# Patient Record
Sex: MALE | Race: WHITE | Employment: OTHER | URBAN - METROPOLITAN AREA
[De-identification: names, ages, dates, MRNs, and addresses within clinical notes are randomized per-mention and may not be internally consistent; named-entity substitution may affect disease eponyms.]

---

## 2017-07-31 ENCOUNTER — OFFICE VISIT (OUTPATIENT)
Dept: FAMILY MEDICINE CLINIC | Facility: CLINIC | Age: 62
End: 2017-07-31

## 2017-07-31 VITALS
SYSTOLIC BLOOD PRESSURE: 142 MMHG | WEIGHT: 204 LBS | HEART RATE: 64 BPM | DIASTOLIC BLOOD PRESSURE: 70 MMHG | RESPIRATION RATE: 16 BRPM | TEMPERATURE: 98 F | HEIGHT: 68 IN | BODY MASS INDEX: 30.92 KG/M2

## 2017-07-31 DIAGNOSIS — M62.830 BACK MUSCLE SPASM: Primary | ICD-10-CM

## 2017-07-31 PROCEDURE — 99213 OFFICE O/P EST LOW 20 MIN: CPT | Performed by: FAMILY MEDICINE

## 2017-07-31 NOTE — PROGRESS NOTES
Patient presents with:  Low Back Pain: x 6 months w/ lower back pain that is always there. Pt states certain movements increase pain.  Pain level 6/10      HPI:   Mirella Cardenas is a 60year old male who presents to the office for back pain eval.  Often

## 2017-09-05 PROBLEM — D36.9 ADENOMATOUS POLYP: Status: ACTIVE | Noted: 2017-09-05

## 2017-11-13 ENCOUNTER — PATIENT MESSAGE (OUTPATIENT)
Dept: FAMILY MEDICINE CLINIC | Facility: CLINIC | Age: 62
End: 2017-11-13

## 2017-11-13 NOTE — TELEPHONE ENCOUNTER
From: Taniya November  To: Pancho Salcedo MD  Sent: 11/13/2017 2:15 AM CST  Subject: Other    This is premature but I like planning ahead. We are working on adopting a little girl currently under our care here in [place].  Her heart has only a single atr

## 2017-12-22 ENCOUNTER — PATIENT MESSAGE (OUTPATIENT)
Dept: FAMILY MEDICINE CLINIC | Facility: CLINIC | Age: 62
End: 2017-12-22

## 2017-12-26 NOTE — TELEPHONE ENCOUNTER
From: Patrick Jett  To: Neela Crawford MD  Sent: 12/22/2017 10:09 PM CST  Subject: Other    Rydal is busy making processes for foreigners more difficult. Now I need a letter from you just like Talia Matos needed.   Our adoption agency says –  If you have

## 2017-12-27 ENCOUNTER — PATIENT MESSAGE (OUTPATIENT)
Dept: FAMILY MEDICINE CLINIC | Facility: CLINIC | Age: 62
End: 2017-12-27

## 2017-12-27 NOTE — TELEPHONE ENCOUNTER
Note sent to pt thru [address] [place] [address] hoping to get response on where and who to send letter to (pt in [facility])

## 2017-12-27 NOTE — TELEPHONE ENCOUNTER
I have written a letter for patient. I have it in my office. I need to find out how to get it to the individual he has named. Is there a fax or alternate method of getting there? Mailing address?     It might be easier to e-mail  Lev Willoughby as I suspec

## 2017-12-27 NOTE — TELEPHONE ENCOUNTER
Note sent thru my chart per Dr. Dayami Summers awaiting instruction on how to send it. I have actual signed letter.

## 2018-01-02 NOTE — TELEPHONE ENCOUNTER
From: Cookie Ricketts  To: Mirta Wills  Sent: 12/27/2017 5:33 PM CST  Subject: Letter for you    Dear Ernestine Mills,    Dr. Arun Galan has written a letter and wants to know is there a fax or alternate method of getting there? Mailing address?  We are unable to e-

## 2018-01-18 ENCOUNTER — PATIENT MESSAGE (OUTPATIENT)
Dept: FAMILY MEDICINE CLINIC | Facility: CLINIC | Age: 63
End: 2018-01-18

## 2018-01-19 NOTE — TELEPHONE ENCOUNTER
From: Babak Hernández  To: Vasile Hadley MD  Sent: 1/18/2018 10:55 PM CST  Subject: Other    My son emailed me saying that the office called to schedule a followup appointment. Who is the followup appointment for, and concerning what?  You should know

## 2018-09-27 ENCOUNTER — TELEPHONE (OUTPATIENT)
Dept: FAMILY MEDICINE CLINIC | Facility: CLINIC | Age: 63
End: 2018-09-27

## 2018-09-27 DIAGNOSIS — Z12.5 SCREENING FOR MALIGNANT NEOPLASM OF PROSTATE: ICD-10-CM

## 2018-09-27 DIAGNOSIS — E78.00 PURE HYPERCHOLESTEROLEMIA: ICD-10-CM

## 2018-09-27 DIAGNOSIS — E55.9 VITAMIN D DEFICIENCY: Primary | ICD-10-CM

## 2018-09-27 DIAGNOSIS — Z00.00 LABORATORY EXAM ORDERED AS PART OF ROUTINE GENERAL MEDICAL EXAMINATION: ICD-10-CM

## 2018-09-27 NOTE — TELEPHONE ENCOUNTER
Pt scheduled physical and needs a blood work order sent to [facility].

## 2018-10-16 ENCOUNTER — APPOINTMENT (OUTPATIENT)
Dept: LAB | Age: 63
End: 2018-10-16
Attending: FAMILY MEDICINE
Payer: COMMERCIAL

## 2018-10-16 DIAGNOSIS — Z12.5 SCREENING FOR MALIGNANT NEOPLASM OF PROSTATE: ICD-10-CM

## 2018-10-16 DIAGNOSIS — E55.9 VITAMIN D DEFICIENCY: ICD-10-CM

## 2018-10-16 DIAGNOSIS — Z00.00 LABORATORY EXAM ORDERED AS PART OF ROUTINE GENERAL MEDICAL EXAMINATION: ICD-10-CM

## 2018-10-16 DIAGNOSIS — E78.00 PURE HYPERCHOLESTEROLEMIA: ICD-10-CM

## 2018-10-16 PROCEDURE — 85027 COMPLETE CBC AUTOMATED: CPT

## 2018-10-16 PROCEDURE — 36415 COLL VENOUS BLD VENIPUNCTURE: CPT

## 2018-10-16 PROCEDURE — 80061 LIPID PANEL: CPT

## 2018-10-16 PROCEDURE — 80053 COMPREHEN METABOLIC PANEL: CPT

## 2018-10-16 PROCEDURE — 82306 VITAMIN D 25 HYDROXY: CPT

## 2018-10-24 ENCOUNTER — OFFICE VISIT (OUTPATIENT)
Dept: FAMILY MEDICINE CLINIC | Facility: CLINIC | Age: 63
End: 2018-10-24

## 2018-10-24 VITALS
WEIGHT: 201 LBS | RESPIRATION RATE: 16 BRPM | HEIGHT: 67.5 IN | BODY MASS INDEX: 31.18 KG/M2 | DIASTOLIC BLOOD PRESSURE: 60 MMHG | SYSTOLIC BLOOD PRESSURE: 120 MMHG | TEMPERATURE: 98 F | HEART RATE: 60 BPM

## 2018-10-24 DIAGNOSIS — G47.9 SLEEP DISTURBANCE: ICD-10-CM

## 2018-10-24 DIAGNOSIS — Z00.00 ANNUAL PHYSICAL EXAM: Primary | ICD-10-CM

## 2018-10-24 DIAGNOSIS — E55.9 VITAMIN D DEFICIENCY: ICD-10-CM

## 2018-10-24 DIAGNOSIS — Z23 NEEDS FLU SHOT: ICD-10-CM

## 2018-10-24 DIAGNOSIS — E78.00 PURE HYPERCHOLESTEROLEMIA: ICD-10-CM

## 2018-10-24 PROCEDURE — G0438 PPPS, INITIAL VISIT: HCPCS | Performed by: FAMILY MEDICINE

## 2018-10-24 PROCEDURE — 99396 PREV VISIT EST AGE 40-64: CPT | Performed by: FAMILY MEDICINE

## 2018-10-24 PROCEDURE — 90686 IIV4 VACC NO PRSV 0.5 ML IM: CPT | Performed by: FAMILY MEDICINE

## 2018-10-24 PROCEDURE — 90471 IMMUNIZATION ADMIN: CPT | Performed by: FAMILY MEDICINE

## 2018-10-24 NOTE — PROGRESS NOTES
Patient presents with:  Physical: annual physical, labs completed flu shot     HPI:   William Martins is a 58year old male who presents for a complete physical exam. Sleep and fatigue issues right now. Trouble staying asleep.       Last colonoscopy:  10 medical history on file.    Past Surgical History:   Procedure Laterality Date   • COLONOSCOPY  1/1/09   • COLONOSCOPY  1/1/01    R Leg placement of pins   • COLONOSCOPY WITH BIOPSY  10/14/14    colon polyps;diverticulil;internal hemorrhoids   • VASECTOMY symmetric reflexes. Normal coordination and gait      ASSESSMENT AND PLAN:     Aisha Moreno was seen in the office today:  had concerns including Physical (annual physical, labs completed flu shot).     1. Annual physical exam  Overall well.   c-scope

## 2018-11-26 ENCOUNTER — NURSE ONLY (OUTPATIENT)
Dept: FAMILY MEDICINE CLINIC | Facility: CLINIC | Age: 63
End: 2018-11-26

## 2018-11-26 PROCEDURE — 90471 IMMUNIZATION ADMIN: CPT | Performed by: FAMILY MEDICINE

## 2018-11-26 PROCEDURE — 90750 HZV VACC RECOMBINANT IM: CPT | Performed by: FAMILY MEDICINE

## 2018-11-26 NOTE — PROGRESS NOTES
Pt received shingrix injection today, pt handled it well, advise to make appointment for next injection.

## 2019-02-26 ENCOUNTER — TELEPHONE (OUTPATIENT)
Dept: FAMILY MEDICINE CLINIC | Facility: CLINIC | Age: 64
End: 2019-02-26

## 2019-02-26 NOTE — TELEPHONE ENCOUNTER
Please assist patient with scheduling a nurse visit for 2nd Shingrix vaccine, any time before May 26, 2019

## 2019-03-18 ENCOUNTER — NURSE ONLY (OUTPATIENT)
Dept: FAMILY MEDICINE CLINIC | Facility: CLINIC | Age: 64
End: 2019-03-18
Payer: COMMERCIAL

## 2019-03-18 ENCOUNTER — TELEPHONE (OUTPATIENT)
Dept: FAMILY MEDICINE CLINIC | Facility: CLINIC | Age: 64
End: 2019-03-18

## 2019-03-18 DIAGNOSIS — Z23 NEED FOR VACCINATION: Primary | ICD-10-CM

## 2019-03-18 PROCEDURE — 90750 HZV VACC RECOMBINANT IM: CPT | Performed by: FAMILY MEDICINE

## 2019-03-18 PROCEDURE — 90471 IMMUNIZATION ADMIN: CPT | Performed by: FAMILY MEDICINE

## 2019-03-18 NOTE — PROGRESS NOTES
Patient presented to the office today for his second dose of Shingrix. The VIS was given to the patient. The waiver was signed. The patient reported no complaints from his first dose of the vaccine which was given 11/26/18.    The chart and vaccine was re

## 2019-04-02 ENCOUNTER — OFFICE VISIT (OUTPATIENT)
Dept: FAMILY MEDICINE CLINIC | Facility: CLINIC | Age: 64
End: 2019-04-02
Payer: COMMERCIAL

## 2019-04-02 VITALS
SYSTOLIC BLOOD PRESSURE: 110 MMHG | DIASTOLIC BLOOD PRESSURE: 60 MMHG | HEART RATE: 84 BPM | BODY MASS INDEX: 31.4 KG/M2 | TEMPERATURE: 99 F | HEIGHT: 69 IN | RESPIRATION RATE: 16 BRPM | WEIGHT: 212 LBS

## 2019-04-02 DIAGNOSIS — J06.9 VIRAL URI: Primary | ICD-10-CM

## 2019-04-02 DIAGNOSIS — J02.9 SORE THROAT: ICD-10-CM

## 2019-04-02 PROCEDURE — 99213 OFFICE O/P EST LOW 20 MIN: CPT | Performed by: FAMILY MEDICINE

## 2019-04-02 PROCEDURE — 87880 STREP A ASSAY W/OPTIC: CPT | Performed by: FAMILY MEDICINE

## 2019-04-02 NOTE — PROGRESS NOTES
Patient presents with:  Sore Throat: sore throat x day     HPI:   Fina Powell is a 58year old male who presents to the office for eval of sore throat. Present about a day. No fever. No cough, just pain in throat. +sputum.       REVIEW OF SYSTEMS:

## 2019-07-31 ENCOUNTER — TELEPHONE (OUTPATIENT)
Dept: FAMILY MEDICINE CLINIC | Facility: CLINIC | Age: 64
End: 2019-07-31

## 2019-07-31 DIAGNOSIS — E78.00 PURE HYPERCHOLESTEROLEMIA: Primary | ICD-10-CM

## 2019-07-31 DIAGNOSIS — E55.9 VITAMIN D DEFICIENCY: ICD-10-CM

## 2019-07-31 DIAGNOSIS — Z12.5 SCREENING FOR MALIGNANT NEOPLASM OF PROSTATE: ICD-10-CM

## 2019-07-31 NOTE — TELEPHONE ENCOUNTER
Please enter lab orders for the patient's upcoming physical appointment. Physical scheduled:    Your appointments     Date & Time Appointment Department [facility])    Oct 25, 2019  9:30 AM CDT Physical - Established Patient with MD Vasiliy Rivers

## 2019-10-03 ENCOUNTER — APPOINTMENT (OUTPATIENT)
Dept: LAB | Age: 64
End: 2019-10-03
Attending: FAMILY MEDICINE
Payer: MEDICARE

## 2019-10-03 DIAGNOSIS — Z12.5 SCREENING FOR MALIGNANT NEOPLASM OF PROSTATE: ICD-10-CM

## 2019-10-03 DIAGNOSIS — E55.9 VITAMIN D DEFICIENCY: ICD-10-CM

## 2019-10-03 DIAGNOSIS — E78.00 PURE HYPERCHOLESTEROLEMIA: ICD-10-CM

## 2019-10-03 PROCEDURE — 80061 LIPID PANEL: CPT

## 2019-10-03 PROCEDURE — 80053 COMPREHEN METABOLIC PANEL: CPT

## 2019-10-03 PROCEDURE — 82306 VITAMIN D 25 HYDROXY: CPT

## 2019-10-25 ENCOUNTER — OFFICE VISIT (OUTPATIENT)
Dept: FAMILY MEDICINE CLINIC | Facility: CLINIC | Age: 64
End: 2019-10-25
Payer: MEDICARE

## 2019-10-25 VITALS
BODY MASS INDEX: 31.05 KG/M2 | SYSTOLIC BLOOD PRESSURE: 108 MMHG | HEART RATE: 78 BPM | DIASTOLIC BLOOD PRESSURE: 80 MMHG | HEIGHT: 67.72 IN | TEMPERATURE: 99 F | WEIGHT: 202.5 LBS | RESPIRATION RATE: 16 BRPM

## 2019-10-25 DIAGNOSIS — E55.9 VITAMIN D DEFICIENCY: ICD-10-CM

## 2019-10-25 DIAGNOSIS — Z23 NEED FOR VACCINATION: ICD-10-CM

## 2019-10-25 DIAGNOSIS — Z00.00 ENCOUNTER FOR ANNUAL HEALTH EXAMINATION: Primary | ICD-10-CM

## 2019-10-25 DIAGNOSIS — Z12.11 COLON CANCER SCREENING: ICD-10-CM

## 2019-10-25 DIAGNOSIS — E78.00 PURE HYPERCHOLESTEROLEMIA: ICD-10-CM

## 2019-10-25 PROCEDURE — G0009 ADMIN PNEUMOCOCCAL VACCINE: HCPCS | Performed by: FAMILY MEDICINE

## 2019-10-25 PROCEDURE — G0402 INITIAL PREVENTIVE EXAM: HCPCS | Performed by: FAMILY MEDICINE

## 2019-10-25 PROCEDURE — 90662 IIV NO PRSV INCREASED AG IM: CPT | Performed by: FAMILY MEDICINE

## 2019-10-25 PROCEDURE — G0008 ADMIN INFLUENZA VIRUS VAC: HCPCS | Performed by: FAMILY MEDICINE

## 2019-10-25 PROCEDURE — 90670 PCV13 VACCINE IM: CPT | Performed by: FAMILY MEDICINE

## 2019-10-25 NOTE — PATIENT INSTRUCTIONS
Nelson Mcqueen 4740 SCHEDULE   Tests on this list are recommended by your physician but may not be covered, or covered at this frequency, by your insurer. Please check with your insurance carrier before scheduling to verify coverage.     PREVENT criteria:   • Men who are 73-68 years old and have smoked more than 100 cigarettes in their lifetime   • Anyone with a family history    Colorectal Cancer Screening Covered up to Age 76     Colonoscopy Screen   Covered every 10 years- more often if abnorma institutions for the mentally retarded   Persons who live in the same house as a HepB virus carrier   Homosexual men   Illicit injectable drug abusers     Tetanus Toxoid- Only covered with a cut with metal- TD and TDaP Not covered by Medicare Part B) Order

## 2019-10-25 NOTE — PROGRESS NOTES
HPI:   Dar Dominique is a 71year old male who presents for a Medicare Initial Preventative Physical Exam (Welcome to Medicare- < 12 months on Medicare). Preventative Screening  Colonoscopy - 10/25/2014. Due for repeat now. Prostate - 1.23.   Gina Lubna Allen was screened for Alcohol abuse and had a score of 0 so is at low risk.      Patient Care Team: Patient Care Team:  Radha Palomares MD as PCP - General (Family Medicine)  Fabi Rothman MD (GASTROENTEROLOGY)    Patient Active Problem List:     Romy García Constitutional: negative  Eyes: negative  Ears, nose, mouth, throat, and face: negative  Respiratory: negative  Cardiovascular: negative  Gastrointestinal: negative  Genitourinary: negative  Neurological: negative     EXAM:   /80   Pulse 78   Temp History   Administered Date(s) Administered   • FLULAVAL 6 months & older 0.5 ml Prefilled syringe (00293) 10/24/2018   • FLUZONE 3 Yrs+ Quad Prsv Free 0.5 ml (04167) 11/14/2016   • HEP B 06/01/2000, 01/01/2006, 03/01/2006   • IPV 06/04/2012   • Influenza do you maintain positive mental well-being?: Visiting Friends    This section provided for quick review of chart, separate sheet to patient  [address] Internal Lab or Procedure External Lab or Procedure   Diabetes Scre house as a HepB virus carrier   Homosexual men   Illicit injectable drug abusers     Tetanus Toxoid  Only covered with a cut with metal- TD and TDaP Not covered by Medicare Part B) 05/01/2006 This may be covered with your prescription benefits, but Barbara Wheeler

## 2020-01-15 PROBLEM — Z86.010 PERSONAL HISTORY OF COLONIC POLYPS: Status: ACTIVE | Noted: 2020-01-15

## 2020-01-15 PROBLEM — Z86.0100 PERSONAL HISTORY OF COLONIC POLYPS: Status: ACTIVE | Noted: 2020-01-15

## 2020-01-15 PROBLEM — D12.5 BENIGN NEOPLASM OF SIGMOID COLON: Status: ACTIVE | Noted: 2020-01-15

## 2020-02-25 ENCOUNTER — HOSPITAL ENCOUNTER (OUTPATIENT)
Facility: HOSPITAL | Age: 65
Setting detail: HOSPITAL OUTPATIENT SURGERY
Discharge: HOME | End: 2020-02-25
Attending: INTERNAL MEDICINE | Admitting: INTERNAL MEDICINE
Payer: MEDICARE

## 2020-02-25 ENCOUNTER — ANESTHESIA EVENT (OUTPATIENT)
Dept: ENDOSCOPY | Facility: HOSPITAL | Age: 65
End: 2020-02-25
Payer: MEDICARE

## 2020-02-25 ENCOUNTER — ANESTHESIA (OUTPATIENT)
Dept: ENDOSCOPY | Facility: HOSPITAL | Age: 65
End: 2020-02-25
Payer: MEDICARE

## 2020-02-25 VITALS
TEMPERATURE: 99 F | OXYGEN SATURATION: 92 % | RESPIRATION RATE: 18 BRPM | HEART RATE: 65 BPM | WEIGHT: 200 LBS | SYSTOLIC BLOOD PRESSURE: 120 MMHG | DIASTOLIC BLOOD PRESSURE: 85 MMHG | BODY MASS INDEX: 29.62 KG/M2 | HEIGHT: 69 IN

## 2020-02-25 DIAGNOSIS — K64.9 HEMORRHOIDS, UNSPECIFIED HEMORRHOID TYPE: ICD-10-CM

## 2020-02-25 PROCEDURE — 06LY4CC OCCLUSION OF HEMORRHOIDAL PLEXUS WITH EXTRALUMINAL DEVICE, PERCUTANEOUS ENDOSCOPIC APPROACH: ICD-10-PCS | Performed by: INTERNAL MEDICINE

## 2020-02-25 RX ORDER — SODIUM CHLORIDE, SODIUM LACTATE, POTASSIUM CHLORIDE, CALCIUM CHLORIDE 600; 310; 30; 20 MG/100ML; MG/100ML; MG/100ML; MG/100ML
INJECTION, SOLUTION INTRAVENOUS CONTINUOUS
Status: DISCONTINUED | OUTPATIENT
Start: 2020-02-25 | End: 2020-02-25

## 2020-02-25 RX ORDER — NALOXONE HYDROCHLORIDE 0.4 MG/ML
80 INJECTION, SOLUTION INTRAMUSCULAR; INTRAVENOUS; SUBCUTANEOUS AS NEEDED
Status: DISCONTINUED | OUTPATIENT
Start: 2020-02-25 | End: 2020-02-25

## 2020-02-25 RX ORDER — HYDROCODONE BITARTRATE AND ACETAMINOPHEN 10; 325 MG/1; MG/1
2 TABLET ORAL AS NEEDED
Status: DISCONTINUED | OUTPATIENT
Start: 2020-02-25 | End: 2020-02-25

## 2020-02-25 RX ORDER — ONDANSETRON 2 MG/ML
4 INJECTION INTRAMUSCULAR; INTRAVENOUS AS NEEDED
Status: DISCONTINUED | OUTPATIENT
Start: 2020-02-25 | End: 2020-02-25

## 2020-02-25 RX ORDER — HYDROCODONE BITARTRATE AND ACETAMINOPHEN 10; 325 MG/1; MG/1
1 TABLET ORAL AS NEEDED
Status: DISCONTINUED | OUTPATIENT
Start: 2020-02-25 | End: 2020-02-25

## 2020-02-25 RX ORDER — METOCLOPRAMIDE HYDROCHLORIDE 5 MG/ML
10 INJECTION INTRAMUSCULAR; INTRAVENOUS AS NEEDED
Status: DISCONTINUED | OUTPATIENT
Start: 2020-02-25 | End: 2020-02-25

## 2020-02-25 RX ORDER — HYDROMORPHONE HYDROCHLORIDE 1 MG/ML
0.4 INJECTION, SOLUTION INTRAMUSCULAR; INTRAVENOUS; SUBCUTANEOUS EVERY 5 MIN PRN
Status: DISCONTINUED | OUTPATIENT
Start: 2020-02-25 | End: 2020-02-25

## 2020-02-25 RX ORDER — LIDOCAINE HYDROCHLORIDE 10 MG/ML
INJECTION, SOLUTION EPIDURAL; INFILTRATION; INTRACAUDAL; PERINEURAL AS NEEDED
Status: DISCONTINUED | OUTPATIENT
Start: 2020-02-25 | End: 2020-02-25 | Stop reason: SURG

## 2020-02-25 RX ADMIN — LIDOCAINE HYDROCHLORIDE 25 MG: 10 INJECTION, SOLUTION EPIDURAL; INFILTRATION; INTRACAUDAL; PERINEURAL at 08:12:00

## 2020-02-25 RX ADMIN — SODIUM CHLORIDE, SODIUM LACTATE, POTASSIUM CHLORIDE, CALCIUM CHLORIDE: 600; 310; 30; 20 INJECTION, SOLUTION INTRAVENOUS at 08:25:00

## 2020-02-25 NOTE — ANESTHESIA PREPROCEDURE EVALUATION
PRE-OP EVALUATION    Patient Name: Alan La    Pre-op Diagnosis: Hemorrhoids, unspecified hemorrhoid type [K64.9]    Procedure(s):   FLEXIBLE SIGMOIDOSCOPY with banding    Surgeon(s) and Role:     Dar Mane MD - Primary    Pre-op vitals notable dental history. Pulmonary    Pulmonary exam normal.  Breath sounds clear to auscultation bilaterally.                Other findings            ASA: 2   Plan: MAC and general  NPO status verified and           Plan/risks discussed with: rashard

## 2020-02-25 NOTE — H&P
[address] Patient Status:  Hospital Outpatient Surgery    1955 MRN [id number]   [facility] ENDOSCOPY Attending Dirk Morgan MD   Date 2020 PCP Gina Perez MD     CC: Hemorrhoid normal.    Lungs: Clear to auscultation. Abdomen: Soft and nondistended. Nontender. No masses. Bowel sounds are present. Extremities: No edema, cyanosis, or clubbing. Skin: Warm and dry.       Assessment/Plan/Procedure:  Patient Active Problem List:

## 2020-02-25 NOTE — ANESTHESIA POSTPROCEDURE EVALUATION
[address] Patient Status:  Hospital Outpatient Surgery   Age/Gender 71year old male MRN [id number]   Location [facility]. Attending Rupinder Mccracken MD   [facility] Day # 0 PCP Julisa Leihg MD       Anesthesia Post-

## 2020-02-25 NOTE — OPERATIVE REPORT
Aaln La Patient Status:  Hospital Outpatient Surgery    1955 MRN [id number]   [facility] ENDOSCOPY Attending Zaki Zavaleta MD   Date 2020 PCP Gregorio Porras MD     PREOPERATIVE DIAGNOSIS/INDICATION: Hemorrhoids

## 2020-02-26 ENCOUNTER — TELEPHONE (OUTPATIENT)
Dept: FAMILY MEDICINE CLINIC | Facility: CLINIC | Age: 65
End: 2020-02-26

## 2020-10-13 ENCOUNTER — TELEPHONE (OUTPATIENT)
Dept: FAMILY MEDICINE CLINIC | Facility: CLINIC | Age: 65
End: 2020-10-13

## 2020-10-13 DIAGNOSIS — Z12.5 SCREENING FOR MALIGNANT NEOPLASM OF PROSTATE: ICD-10-CM

## 2020-10-13 DIAGNOSIS — E78.00 PURE HYPERCHOLESTEROLEMIA: Primary | ICD-10-CM

## 2020-10-13 DIAGNOSIS — E55.9 VITAMIN D DEFICIENCY: ICD-10-CM

## 2020-10-13 NOTE — TELEPHONE ENCOUNTER
Please enter lab orders for the patient's upcoming physical appointment. Physical scheduled:    Your appointments     Date & Time Appointment Department [facility])    Nov 02, 2020  3:00 PM CST Physical - Established with Leonel Jeffrey MD [address] [place] [address]

## 2020-10-19 ENCOUNTER — LAB ENCOUNTER (OUTPATIENT)
Dept: LAB | Age: 65
End: 2020-10-19
Attending: FAMILY MEDICINE
Payer: MEDICARE

## 2020-10-19 DIAGNOSIS — E78.00 PURE HYPERCHOLESTEROLEMIA: ICD-10-CM

## 2020-10-19 DIAGNOSIS — Z12.5 SCREENING FOR MALIGNANT NEOPLASM OF PROSTATE: ICD-10-CM

## 2020-10-19 DIAGNOSIS — E55.9 VITAMIN D DEFICIENCY: ICD-10-CM

## 2020-10-19 PROCEDURE — 80053 COMPREHEN METABOLIC PANEL: CPT

## 2020-10-19 PROCEDURE — 80061 LIPID PANEL: CPT

## 2020-10-19 PROCEDURE — 36415 COLL VENOUS BLD VENIPUNCTURE: CPT

## 2020-10-19 PROCEDURE — 82306 VITAMIN D 25 HYDROXY: CPT

## 2020-11-02 ENCOUNTER — OFFICE VISIT (OUTPATIENT)
Dept: FAMILY MEDICINE CLINIC | Facility: CLINIC | Age: 65
End: 2020-11-02
Payer: MEDICARE

## 2020-11-02 VITALS
BODY MASS INDEX: 31.39 KG/M2 | RESPIRATION RATE: 16 BRPM | HEART RATE: 88 BPM | SYSTOLIC BLOOD PRESSURE: 138 MMHG | WEIGHT: 202.38 LBS | TEMPERATURE: 97 F | DIASTOLIC BLOOD PRESSURE: 72 MMHG | HEIGHT: 67.5 IN

## 2020-11-02 DIAGNOSIS — E55.9 VITAMIN D DEFICIENCY: ICD-10-CM

## 2020-11-02 DIAGNOSIS — E78.00 PURE HYPERCHOLESTEROLEMIA: ICD-10-CM

## 2020-11-02 DIAGNOSIS — Z00.00 ENCOUNTER FOR ANNUAL HEALTH EXAMINATION: Primary | ICD-10-CM

## 2020-11-02 DIAGNOSIS — Z23 NEED FOR VACCINATION: ICD-10-CM

## 2020-11-02 PROCEDURE — 90732 PPSV23 VACC 2 YRS+ SUBQ/IM: CPT | Performed by: FAMILY MEDICINE

## 2020-11-02 PROCEDURE — G0008 ADMIN INFLUENZA VIRUS VAC: HCPCS | Performed by: FAMILY MEDICINE

## 2020-11-02 PROCEDURE — G0009 ADMIN PNEUMOCOCCAL VACCINE: HCPCS | Performed by: FAMILY MEDICINE

## 2020-11-02 PROCEDURE — G0438 PPPS, INITIAL VISIT: HCPCS | Performed by: FAMILY MEDICINE

## 2020-11-02 PROCEDURE — 90662 IIV NO PRSV INCREASED AG IM: CPT | Performed by: FAMILY MEDICINE

## 2020-11-02 NOTE — PATIENT INSTRUCTIONS
Nelson Mcqueen 4740 SCHEDULE   Tests on this list are recommended by your physician but may not be covered, or covered at this frequency, by your insurer. Please check with your insurance carrier before scheduling to verify coverage.     PREVENT • Men who are 73-68 years old and have smoked more than 100 cigarettes in their lifetime   • Anyone with a family history    Colorectal Cancer Screening Covered up to Age 76     Colonoscopy Screen   Covered every 10 years- more often if abnormal Colonosc concentrates   Clients of institutions for the mentally retarded   Persons who live in the same house as a HepB virus carrier   Homosexual men   Illicit injectable drug abusers     Tetanus Toxoid- Only covered with a cut with metal- TD and TDaP Not covered

## 2020-11-02 NOTE — PROGRESS NOTES
HPI:   Camilla Essex is a 76year old male who presents for a Medicare Initial Annual Wellness visit (Once after 12 month Medicare anniversary) . Preventative Screening  Colonoscopy - 1/2020. Repeat 5 yrs - 2025  Prostate - 1.39 on recent labs.    [facility])  Edson Garrido MD (GASTROENTEROLOGY)    Patient Active Problem List:     Sprain of medial collateral ligament of knee     Pure hypercholesterolemia     Vitamin D deficiency     BPH (benign prostatic hyperplasia)     Adenomatous polyp     Perso Diabetes in his father and maternal grandmother; Pacemaker in his father; Seizure Disorder in his father; Stroke in his mother; Uterine Cancer in his sister. SOCIAL HISTORY:   He  reports that he has never smoked.  He has never used smokeless tobacco. He masses, no organomegaly   Extremities: Extremities normal, atraumatic, no cyanosis or edema   Pulses: 2+ and symmetric   Skin: Skin color, texture, turgor normal, no rashes or lesions   Neurologic: Normal         Vaccination History     Immunization Histor poor?: No  How does the patient maintain a good energy level?: Other(works around the house)  How would you describe your daily physical activity?: Moderate  How would you describe your current health state?: Good  How do you maintain positive mental well- 03/01/2006 Medium/high risk factors:   End-stage renal disease   Hemophiliacs who received Factor VIII or IX concentrates   Clients of institutions for the mentally retarded   Persons who live in the same house as a HepB virus carrier   Homosexual men   [place]

## 2021-03-13 DIAGNOSIS — Z23 NEED FOR VACCINATION: ICD-10-CM

## 2021-03-23 ENCOUNTER — PATIENT MESSAGE (OUTPATIENT)
Dept: FAMILY MEDICINE CLINIC | Facility: CLINIC | Age: 66
End: 2021-03-23

## 2021-03-24 NOTE — TELEPHONE ENCOUNTER
From: Nate Cornejo  To: Klyah Rios MD  Sent: 3/23/2021 6:13 PM CDT  Subject: Other    Covid-19 Vaccination Record

## 2021-05-11 ENCOUNTER — OFFICE VISIT (OUTPATIENT)
Dept: FAMILY MEDICINE CLINIC | Facility: CLINIC | Age: 66
End: 2021-05-11
Payer: MEDICARE

## 2021-05-11 VITALS
HEIGHT: 68 IN | OXYGEN SATURATION: 98 % | RESPIRATION RATE: 16 BRPM | TEMPERATURE: 98 F | DIASTOLIC BLOOD PRESSURE: 72 MMHG | HEART RATE: 84 BPM | SYSTOLIC BLOOD PRESSURE: 130 MMHG | WEIGHT: 207 LBS | BODY MASS INDEX: 31.37 KG/M2

## 2021-05-11 DIAGNOSIS — R06.00 DYSPNEA ON EXERTION: Primary | ICD-10-CM

## 2021-05-11 PROCEDURE — 99214 OFFICE O/P EST MOD 30 MIN: CPT | Performed by: FAMILY MEDICINE

## 2021-05-11 NOTE — PROGRESS NOTES
Patient presents with:  Fatigue: SOB Concerns     HPI:   Levy Current is a 76year old male with PMH HLD who presents to the office for eval of fatigue. Over the last 6 months, getting more winded and out of breath with activity.      Denies any chest office today:  had concerns including Fatigue (SOB Concerns). 1. Dyspnea on exertion  Suspect deconditioning  Denies red flag symptoms like CP, palpitations  Exam benign.   Outside of being overweight RRR, normal HR, oxygen, lung sounds  Will start darrell

## 2021-05-21 ENCOUNTER — LAB ENCOUNTER (OUTPATIENT)
Dept: LAB | Facility: HOSPITAL | Age: 66
End: 2021-05-21
Attending: FAMILY MEDICINE
Payer: MEDICARE

## 2021-05-21 DIAGNOSIS — R06.00 DYSPNEA ON EXERTION: ICD-10-CM

## 2021-05-24 ENCOUNTER — HOSPITAL ENCOUNTER (OUTPATIENT)
Dept: CV DIAGNOSTICS | Facility: HOSPITAL | Age: 66
Discharge: HOME | End: 2021-05-24
Attending: FAMILY MEDICINE
Payer: MEDICARE

## 2021-05-24 DIAGNOSIS — R06.00 DYSPNEA ON EXERTION: ICD-10-CM

## 2021-05-24 PROCEDURE — 93018 CV STRESS TEST I&R ONLY: CPT | Performed by: FAMILY MEDICINE

## 2021-05-24 PROCEDURE — 93017 CV STRESS TEST TRACING ONLY: CPT | Performed by: FAMILY MEDICINE

## 2021-05-24 PROCEDURE — 4A12XM4 MONITORING OF CARDIAC STRESS, EXTERNAL APPROACH: ICD-10-PCS | Performed by: INTERNAL MEDICINE

## 2021-10-29 ENCOUNTER — TELEPHONE (OUTPATIENT)
Dept: FAMILY MEDICINE CLINIC | Facility: CLINIC | Age: 66
End: 2021-10-29

## 2021-11-03 ENCOUNTER — OFFICE VISIT (OUTPATIENT)
Dept: FAMILY MEDICINE CLINIC | Facility: CLINIC | Age: 66
End: 2021-11-03
Payer: MEDICARE

## 2021-11-03 VITALS
HEIGHT: 67.72 IN | BODY MASS INDEX: 31.58 KG/M2 | DIASTOLIC BLOOD PRESSURE: 74 MMHG | WEIGHT: 206 LBS | TEMPERATURE: 97 F | HEART RATE: 70 BPM | SYSTOLIC BLOOD PRESSURE: 128 MMHG | RESPIRATION RATE: 16 BRPM | OXYGEN SATURATION: 97 %

## 2021-11-03 DIAGNOSIS — Z00.00 ENCOUNTER FOR ANNUAL HEALTH EXAMINATION: Primary | ICD-10-CM

## 2021-11-03 DIAGNOSIS — E55.9 VITAMIN D DEFICIENCY: ICD-10-CM

## 2021-11-03 DIAGNOSIS — Z23 NEEDS FLU SHOT: ICD-10-CM

## 2021-11-03 DIAGNOSIS — E78.00 PURE HYPERCHOLESTEROLEMIA: ICD-10-CM

## 2021-11-03 DIAGNOSIS — F41.9 ANXIETY: ICD-10-CM

## 2021-11-03 DIAGNOSIS — Z12.5 SCREENING FOR MALIGNANT NEOPLASM OF PROSTATE: ICD-10-CM

## 2021-11-03 DIAGNOSIS — Z13.31 DEPRESSION SCREENING: ICD-10-CM

## 2021-11-03 PROBLEM — D12.5 BENIGN NEOPLASM OF SIGMOID COLON: Status: RESOLVED | Noted: 2020-01-15 | Resolved: 2021-11-03

## 2021-11-03 PROBLEM — D36.9 ADENOMATOUS POLYP: Status: RESOLVED | Noted: 2017-09-05 | Resolved: 2021-11-03

## 2021-11-03 PROBLEM — Z86.0100 PERSONAL HISTORY OF COLONIC POLYPS: Status: RESOLVED | Noted: 2020-01-15 | Resolved: 2021-11-03

## 2021-11-03 PROBLEM — Z86.010 PERSONAL HISTORY OF COLONIC POLYPS: Status: RESOLVED | Noted: 2020-01-15 | Resolved: 2021-11-03

## 2021-11-03 PROCEDURE — G0439 PPPS, SUBSEQ VISIT: HCPCS | Performed by: FAMILY MEDICINE

## 2021-11-03 PROCEDURE — G0008 ADMIN INFLUENZA VIRUS VAC: HCPCS | Performed by: FAMILY MEDICINE

## 2021-11-03 PROCEDURE — G0444 DEPRESSION SCREEN ANNUAL: HCPCS | Performed by: FAMILY MEDICINE

## 2021-11-03 PROCEDURE — 90662 IIV NO PRSV INCREASED AG IM: CPT | Performed by: FAMILY MEDICINE

## 2021-11-03 RX ORDER — TRAZODONE HYDROCHLORIDE 50 MG/1
50 TABLET ORAL NIGHTLY
Qty: 30 TABLET | Refills: 1 | Status: SHIPPED | OUTPATIENT
Start: 2021-11-03 | End: 2022-01-12

## 2021-11-03 NOTE — PROGRESS NOTES
HPI:   Rivera Amor is a 78year old male who presents for a Medicare Subsequent Annual Wellness visit (Pt already had Initial Annual Wellness). Preventative Screening  Colonoscopy -January 2020. Repeat 5 years-2025  Prostate - BPH.   PSA 1.39 a ye of  for [facility] on file in [facility] [address]    Advance care planning including the explanation and discussion of advance directives standard forms performed Face to Face with patient and Family/surrogate (if present), and forms available to patient in AVS Hemorrhoids (Decades), Irregular bowel habits, Sleep disturbance, Stress (Years), Visual impairment, and Wears glasses (1960).     He  has a past surgical history that includes colonoscopy (1/1/09); colonoscopy (1/1/01); vasectomy; and colonoscopy with biop intact   Neck: Supple, symmetrical, trachea midline, no adenopathy, thyroid: not enlarged, symmetric, no tenderness/mass/nodules   Back:   Symmetric, no curvature, ROM normal, no CVA tenderness   Lungs:   Clear to auscultation bilaterally, respirations unl COMP METABOLIC PANEL (14); Future  - LIPID PANEL; Future    3. Vitamin D deficiency  Checklevels   - VITAMIN D; Future    4. Screening for malignant neoplasm of prostate  routine  - PSA SCREEN; Future    5.  Needs flu shot  given  - FLU VACC HIGH DOSE PRSV every 5 years for all Medicare beneficiaries without apparent signs or symptoms of cardiovascular disease Lab Results   Component Value Date    CHOLEST 197 10/19/2020    HDL 50 10/19/2020     (H) 10/19/2020    TRIG 172 (H) 10/19/2020         Electro

## 2021-11-03 NOTE — PATIENT INSTRUCTIONS
Nelson Mcqueen 4740 SCHEDULE   Tests on this list are recommended by your physician but may not be covered, or covered at this frequency, by your insurer. Please check with your insurance carrier before scheduling to verify coverage.    PREVEN Each vaccine ([id number] & [id number]) covered once after 65 Prevnar 13: 10/25/2019    [id number]: 11/02/2020     No recommendations at this time    Hepatitis B One screening covered for patients with certain risk factors   03/01/2006  No recommendations

## 2021-11-11 ENCOUNTER — LAB ENCOUNTER (OUTPATIENT)
Dept: LAB | Age: 66
End: 2021-11-11
Attending: FAMILY MEDICINE
Payer: MEDICARE

## 2021-11-11 DIAGNOSIS — Z12.5 SCREENING FOR MALIGNANT NEOPLASM OF PROSTATE: ICD-10-CM

## 2021-11-11 DIAGNOSIS — E78.00 PURE HYPERCHOLESTEROLEMIA: ICD-10-CM

## 2021-11-11 DIAGNOSIS — E55.9 VITAMIN D DEFICIENCY: ICD-10-CM

## 2021-11-11 PROCEDURE — 36415 COLL VENOUS BLD VENIPUNCTURE: CPT

## 2021-11-11 PROCEDURE — 80061 LIPID PANEL: CPT

## 2021-11-11 PROCEDURE — 80053 COMPREHEN METABOLIC PANEL: CPT

## 2021-11-11 PROCEDURE — 82306 VITAMIN D 25 HYDROXY: CPT

## 2022-01-12 ENCOUNTER — TELEMEDICINE (OUTPATIENT)
Dept: FAMILY MEDICINE CLINIC | Facility: CLINIC | Age: 67
End: 2022-01-12

## 2022-01-12 DIAGNOSIS — R35.1 BPH ASSOCIATED WITH NOCTURIA: ICD-10-CM

## 2022-01-12 DIAGNOSIS — N40.1 BPH ASSOCIATED WITH NOCTURIA: ICD-10-CM

## 2022-01-12 DIAGNOSIS — G47.9 SLEEP DISTURBANCE: Primary | ICD-10-CM

## 2022-01-12 PROCEDURE — 99213 OFFICE O/P EST LOW 20 MIN: CPT | Performed by: FAMILY MEDICINE

## 2022-01-12 NOTE — PROGRESS NOTES
Patient presents with:  Sleep Problem  Urinary     Venessa Obando is a 78year old male who presents via video encounter. HPI:   Tired, nausea this AM.   Feels like he may have a virus. Sore throat. Headache. On and off fever. None for 3 days.   CO

## 2022-10-17 ENCOUNTER — TELEPHONE (OUTPATIENT)
Dept: FAMILY MEDICINE CLINIC | Facility: CLINIC | Age: 67
End: 2022-10-17

## 2022-10-17 DIAGNOSIS — E78.00 PURE HYPERCHOLESTEROLEMIA: ICD-10-CM

## 2022-10-17 DIAGNOSIS — Z12.5 SCREENING FOR MALIGNANT NEOPLASM OF PROSTATE: ICD-10-CM

## 2022-10-17 DIAGNOSIS — R35.1 BPH ASSOCIATED WITH NOCTURIA: Primary | ICD-10-CM

## 2022-10-17 DIAGNOSIS — E55.9 VITAMIN D DEFICIENCY: ICD-10-CM

## 2022-10-17 DIAGNOSIS — N40.1 BPH ASSOCIATED WITH NOCTURIA: Primary | ICD-10-CM

## 2022-10-17 NOTE — TELEPHONE ENCOUNTER
Please enter lab orders for the patient's upcoming physical appointment. Physical scheduled: Your appointments     Date & Time Appointment Department [facility])    Dec 13, 2022  9:30 AM CST Medicare Annual Well Visit with MD Mis Diaz 26, [address], [place]  ([address])            [address] [id number]         Preferred lab: [facility])     The patient has been notified to complete fasting labs prior to their physical appointment.

## 2022-12-09 ENCOUNTER — LAB ENCOUNTER (OUTPATIENT)
Dept: LAB | Age: 67
End: 2022-12-09
Attending: FAMILY MEDICINE
Payer: MEDICARE

## 2022-12-09 DIAGNOSIS — N40.1 BPH ASSOCIATED WITH NOCTURIA: ICD-10-CM

## 2022-12-09 DIAGNOSIS — Z12.5 SCREENING FOR MALIGNANT NEOPLASM OF PROSTATE: ICD-10-CM

## 2022-12-09 DIAGNOSIS — R35.1 BPH ASSOCIATED WITH NOCTURIA: ICD-10-CM

## 2022-12-09 DIAGNOSIS — E78.00 PURE HYPERCHOLESTEROLEMIA: ICD-10-CM

## 2022-12-09 DIAGNOSIS — E55.9 VITAMIN D DEFICIENCY: ICD-10-CM

## 2022-12-09 LAB
ALBUMIN SERPL-MCNC: 4 G/DL (ref 3.4–5)
ALBUMIN/GLOB SERPL: 1.3 {RATIO} (ref 1–2)
ALP LIVER SERPL-CCNC: 57 U/L
ALT SERPL-CCNC: 30 U/L
ANION GAP SERPL CALC-SCNC: 5 MMOL/L (ref 0–18)
AST SERPL-CCNC: 17 U/L (ref 15–37)
BILIRUB SERPL-MCNC: 0.6 MG/DL (ref 0.1–2)
BUN BLD-MCNC: 18 MG/DL (ref 7–18)
CALCIUM BLD-MCNC: 9.1 MG/DL (ref 8.5–10.1)
CHLORIDE SERPL-SCNC: 105 MMOL/L (ref 98–112)
CHOLEST SERPL-MCNC: 210 MG/DL (ref ?–200)
CO2 SERPL-SCNC: 27 MMOL/L (ref 21–32)
COMPLEXED PSA SERPL-MCNC: 2.24 NG/ML (ref ?–4)
CREAT BLD-MCNC: 0.93 MG/DL
FASTING PATIENT LIPID ANSWER: YES
FASTING STATUS PATIENT QL REPORTED: YES
GFR SERPLBLD BASED ON 1.73 SQ M-ARVRAT: 89 ML/MIN/1.73M2 (ref 60–?)
GLOBULIN PLAS-MCNC: 3.2 G/DL (ref 2.8–4.4)
GLUCOSE BLD-MCNC: 102 MG/DL (ref 70–99)
HDLC SERPL-MCNC: 54 MG/DL (ref 40–59)
LDLC SERPL CALC-MCNC: 134 MG/DL (ref ?–100)
NONHDLC SERPL-MCNC: 156 MG/DL (ref ?–130)
OSMOLALITY SERPL CALC.SUM OF ELEC: 286 MOSM/KG (ref 275–295)
POTASSIUM SERPL-SCNC: 4.6 MMOL/L (ref 3.5–5.1)
PROT SERPL-MCNC: 7.2 G/DL (ref 6.4–8.2)
SODIUM SERPL-SCNC: 137 MMOL/L (ref 136–145)
TRIGL SERPL-MCNC: 123 MG/DL (ref 30–149)
VIT D+METAB SERPL-MCNC: 21.5 NG/ML (ref 30–100)
VLDLC SERPL CALC-MCNC: 22 MG/DL (ref 0–30)

## 2022-12-09 PROCEDURE — 80053 COMPREHEN METABOLIC PANEL: CPT

## 2022-12-09 PROCEDURE — 80061 LIPID PANEL: CPT

## 2022-12-09 PROCEDURE — 36415 COLL VENOUS BLD VENIPUNCTURE: CPT

## 2022-12-09 PROCEDURE — 82306 VITAMIN D 25 HYDROXY: CPT

## 2022-12-13 ENCOUNTER — OFFICE VISIT (OUTPATIENT)
Dept: FAMILY MEDICINE CLINIC | Facility: CLINIC | Age: 67
End: 2022-12-13
Payer: MEDICARE

## 2022-12-13 VITALS
BODY MASS INDEX: 30.86 KG/M2 | SYSTOLIC BLOOD PRESSURE: 122 MMHG | OXYGEN SATURATION: 98 % | TEMPERATURE: 97 F | HEART RATE: 80 BPM | DIASTOLIC BLOOD PRESSURE: 74 MMHG | WEIGHT: 203.63 LBS | RESPIRATION RATE: 16 BRPM | HEIGHT: 68 IN

## 2022-12-13 DIAGNOSIS — E55.9 VITAMIN D DEFICIENCY: ICD-10-CM

## 2022-12-13 DIAGNOSIS — E78.00 PURE HYPERCHOLESTEROLEMIA: ICD-10-CM

## 2022-12-13 DIAGNOSIS — S83.92XA SPRAIN OF LEFT KNEE, UNSPECIFIED LIGAMENT, INITIAL ENCOUNTER: ICD-10-CM

## 2022-12-13 DIAGNOSIS — Z00.00 ENCOUNTER FOR ANNUAL HEALTH EXAMINATION: Primary | ICD-10-CM

## 2022-12-13 PROCEDURE — G0439 PPPS, SUBSEQ VISIT: HCPCS | Performed by: FAMILY MEDICINE

## 2022-12-13 PROCEDURE — 1125F AMNT PAIN NOTED PAIN PRSNT: CPT | Performed by: FAMILY MEDICINE

## 2023-12-15 ENCOUNTER — OFFICE VISIT (OUTPATIENT)
Dept: FAMILY MEDICINE CLINIC | Facility: CLINIC | Age: 68
End: 2023-12-15
Payer: MEDICARE

## 2023-12-15 VITALS
HEART RATE: 66 BPM | HEIGHT: 68 IN | SYSTOLIC BLOOD PRESSURE: 150 MMHG | WEIGHT: 206.5 LBS | BODY MASS INDEX: 31.3 KG/M2 | RESPIRATION RATE: 16 BRPM | OXYGEN SATURATION: 96 % | DIASTOLIC BLOOD PRESSURE: 80 MMHG

## 2023-12-15 DIAGNOSIS — E78.00 PURE HYPERCHOLESTEROLEMIA: ICD-10-CM

## 2023-12-15 DIAGNOSIS — E55.9 VITAMIN D DEFICIENCY: ICD-10-CM

## 2023-12-15 DIAGNOSIS — Z00.00 ENCOUNTER FOR ANNUAL HEALTH EXAMINATION: Primary | ICD-10-CM

## 2024-05-09 ENCOUNTER — OFFICE VISIT (OUTPATIENT)
Dept: FAMILY MEDICINE CLINIC | Facility: CLINIC | Age: 69
End: 2024-05-09
Payer: MEDICARE

## 2024-05-09 VITALS
TEMPERATURE: 99 F | BODY MASS INDEX: 32.14 KG/M2 | SYSTOLIC BLOOD PRESSURE: 138 MMHG | HEIGHT: 67.5 IN | WEIGHT: 207.19 LBS | HEART RATE: 76 BPM | RESPIRATION RATE: 20 BRPM | DIASTOLIC BLOOD PRESSURE: 78 MMHG

## 2024-05-09 DIAGNOSIS — G89.29 CHRONIC BILATERAL LOW BACK PAIN WITHOUT SCIATICA: ICD-10-CM

## 2024-05-09 DIAGNOSIS — G89.29 CHRONIC PAIN OF RIGHT ANKLE: Primary | ICD-10-CM

## 2024-05-09 DIAGNOSIS — M54.50 CHRONIC BILATERAL LOW BACK PAIN WITHOUT SCIATICA: ICD-10-CM

## 2024-05-09 DIAGNOSIS — M25.571 CHRONIC PAIN OF RIGHT ANKLE: Primary | ICD-10-CM

## 2024-05-09 PROCEDURE — 99213 OFFICE O/P EST LOW 20 MIN: CPT | Performed by: FAMILY MEDICINE

## 2024-05-09 NOTE — PROGRESS NOTES
Chief Complaint   Patient presents with    Ankle Pain     R ankle surgery approximately 20 years ago with possible screw insertion. C/o periodic pain over the past 6 months. Wears an ankle wrap when walking which helps.      HPI:   Renny Prado is a 68 year old male who presents to the office for eval of ankle pain.  H/o R ankle fracture years ago.  Starting to cause more pains in the last 6 months.      The ankle pain is on and off.  Better when wearing a brace.  It can limit his walking and getting around.      Chronic back pains, hip pain.  Height is getting smaller.  There are times it limits his ability to get up.    Being active and walking tends to help the symptoms.        REVIEW OF SYSTEMS:   Pertinent items are noted in HPI.  EXAM:   /78   Pulse 76   Temp 99 °F (37.2 °C) (Oral)   Resp 20   Ht 5' 7.5\" (1.715 m)   Wt 207 lb 3.2 oz (94 kg)   BMI 31.97 kg/m²     Gen - overweight.  No distress.  No assisted devices.   Back exam - normal ROM.  Mild spasms noted at midline but not tender.  No radiculopathy  Neurological - alert, oriented, normal speech, no focal findings or movement disorder noted, DTR's normal and symmetric, motor and sensory grossly normal bilaterally  Extremities - peripheral pulses normal, no pedal edema, no clubbing or cyanosis.  Surgical scar on medial R ankle.   ASSESSMENT AND PLAN:     Renny Prado was seen in the office today:  had concerns including Ankle Pain (R ankle surgery approximately 20 years ago with possible screw insertion. C/o periodic pain over the past 6 months. Wears an ankle wrap when walking which helps.).    1. Chronic pain of right ankle  H/o fracture, having pains on and  Will refer to ortho team for further workup given the recurrent nature  - Ortho Referral - [facility]    2. Chronic bilateral low back pain without sciatica  Recommend getting weight down, exercise  Can pursue PT if desired.  No red flag symptoms.   [facility]      Peng Caldwell M.D.   [facility]  05/09/24

## 2024-06-05 ENCOUNTER — OFFICE VISIT (OUTPATIENT)
Dept: ORTHOPEDICS CLINIC | Facility: CLINIC | Age: 69
End: 2024-06-05
Payer: MEDICARE

## 2024-06-05 ENCOUNTER — HOSPITAL ENCOUNTER (OUTPATIENT)
Dept: GENERAL RADIOLOGY | Age: 69
Discharge: HOME | End: 2024-06-05
Attending: ORTHOPAEDIC SURGERY
Payer: MEDICARE

## 2024-06-05 VITALS — WEIGHT: 207 LBS | BODY MASS INDEX: 32.11 KG/M2 | HEIGHT: 67.5 IN

## 2024-06-05 DIAGNOSIS — M25.571 RIGHT ANKLE PAIN, UNSPECIFIED CHRONICITY: ICD-10-CM

## 2024-06-05 DIAGNOSIS — M12.571 TRAUMATIC ARTHRITIS OF ANKLE, RIGHT: Primary | ICD-10-CM

## 2024-06-05 DIAGNOSIS — Z96.9 RETAINED ORTHOPEDIC HARDWARE: ICD-10-CM

## 2024-06-05 PROCEDURE — 99203 OFFICE O/P NEW LOW 30 MIN: CPT | Performed by: ORTHOPAEDIC SURGERY

## 2024-06-05 PROCEDURE — 73610 X-RAY EXAM OF ANKLE: CPT | Performed by: ORTHOPAEDIC SURGERY

## 2024-06-05 NOTE — PROGRESS NOTES
[facility] New Patient Note    Chief Complaint   Patient presents with    Ankle Pain     RT ANKLE PAIN;ONSET:approximately 6 months  -20 year old injury  -sharp pains  -limited ROM       HPI: The patient is a 69 year old male who presents at the request of Dr. Peng Caldwell with complaints of intermittent sharp pains at the anterior medial aspect of his right ankle.  He relates history of right ankle fracture requiring open reduction and internal fixation performed about 20 years ago.  He subsequently had some of the hardware removed shortly after his index surgery.  He has functioned quite well over the past to get decades until he noted some intermittent sharp pains, stiffness and limited motion about the right ankle.  This has plagued him on and off for the past 6 months without any noticeable acceleration in symptoms.  He denies locking, catching, redness, warmth or mechanical complaints.  His symptoms are distinctly different and sharper than the soreness of his hip and knee arthritis.    Past Medical History:    Arthritis    Hips, lower back    Flatulence/gas pain/belching    Occassional    Frequent urination    Hemorrhoids    Irregular bowel habits    Don’t have bowel movement every day.    Sleep disturbance    Stress + chronically ill 4 year old daughter    Stress    Lived in [place], now back with culture shock    Visual impairment    glasses    Wears glasses     Past Surgical History:   Procedure Laterality Date    Colonoscopy  1/1/09    Colonoscopy  1/1/01    R Leg placement of pins    Colonoscopy with biopsy  10/14/14    colon polyps;diverticulil;internal hemorrhoids    Vasectomy       Current Outpatient Medications   Medication Sig Dispense Refill    Saw Richmond 80 MG Oral Cap Take 1 capsule by mouth daily.      Glucosamine-Chondroitin (GLUCOSAMINE CHONDR COMPLEX OR) Take by mouth daily.        Multiple Vitamins-Minerals (MULTI COMPLETE OR) Take by mouth daily.        Ascorbic Acid (VITAMIN  C) 1000 MG Oral Tab Take 1 tablet (1,000 mg total) by mouth daily.      Cholecalciferol (VITAMIN D) 1000 UNITS Oral Tab Take by mouth daily.        omega-3 fatty acids (FISH OIL) 1000 MG Oral Cap Take 1,000 mg by mouth daily.       No Known Allergies  Family History   Problem Relation Age of Onset    Stroke Mother     Pacemaker Father     Diabetes Father         DM    Seizure Disorder Father     Cancer Sister         Uterine    Cancer Sister         Uterine    Breast Cancer Sister     Uterine Cancer Sister         Rather unique, spread all over    Diabetes Maternal Grandmother      Social History     Occupational History    Occupation: retired   Tobacco Use    Smoking status: Never    Smokeless tobacco: Never   Vaping Use    Vaping status: Never Used   Substance and Sexual Activity    Alcohol use: Never     Alcohol/week: 0.0 standard drinks of alcohol    Drug use: Never    Sexual activity: Not on file        ROS:  Complete ROS reviewed by me and non-contributory to the chief complaint except as mentioned above.    Physical Exam:    Ht 5' 7.5\" (1.715 m)   Wt 207 lb (93.9 kg)   BMI 31.94 kg/m²   Constitutional: Well developed, well nourished 69 year old male  Psychological: NAD, alert and appropriate  Respiratory: Breathing comfortably on room air with RR of 10-14  Cardiac: Palpable distal pulses with pink warm extremities  Gait is nonantalgic.  Well-healed surgical scars are noted at the anterior medial right ankle.  He has symmetrical active dorsi and plantarflexion with no palpable bony tenderness at the malleolus medially or laterally.  Toe and heel walking is well-tolerated.  Anterior drawer is negative and he has adequate talar inversion and eversion comparable to the left.  Neurovascular status is intact on sensory, motor and perfusion assessment distally.    Imaging: Multiple views of right ankle personally viewed, demonstrating 3 retained anterior medial screws consistent with his history of ORIF with no  evidence of hardware complications or breakage.  Posttraumatic arthrosis is noted at the anterior medial gutter of the ankle.    XR ANKLE WEIGHTBEARING (3 VIEWS), RIGHT (CPT=73610)    Result Date: 6/5/2024  CONCLUSION:  Status post remote ORIF for medial malleolar fracture.  No acute process or significant disease is identified.   LOCATION:  [facility]   Dictated by (CST): Jose Luis Heard DO on 6/05/2024 at 3:09 PM     Finalized by (CST): Jose Luis Heard DO on 6/05/2024 at 3:10 PM          Assessment/Diagnoses:  Diagnoses and all orders for this visit:    Traumatic arthritis of ankle, right    Retained orthopedic hardware      Plan:  I reviewed imaging and exam findings with the patient.  Intermittent symptoms are likely due to developing posttraumatic osteoarthritis of the right ankle.  Most of his changes are localized medially with minimal seen laterally.  He has adequate motion, activity tolerance but may experience intermittent sharp pains depending on his level of activity.  Given an elevated BMI, I suggested partial or nonweightbearing forms of cardiovascular exercise such as swimming or cycling.  He has excellent range of motion, no tenderness over the hardware and a stable ankle.  Treatment options include anti-inflammatories, activity protection, compression and icing.  Beyond this, we could consider corticosteroid injection if there are signs of synovitis or escalating symptoms.  All questions were answered and the patient verbalized understanding and appreciation.  He will follow-up with us if needed.      Elissa Stovall MD  [facility]      This document was partially prepared using Dragon Medical voice recognition software.

## 2024-11-21 ENCOUNTER — TELEPHONE (OUTPATIENT)
Dept: FAMILY MEDICINE CLINIC | Facility: CLINIC | Age: 69
End: 2024-11-21

## 2024-11-21 DIAGNOSIS — Z12.5 SCREENING FOR MALIGNANT NEOPLASM OF PROSTATE: ICD-10-CM

## 2024-11-21 DIAGNOSIS — N40.0 BENIGN PROSTATIC HYPERPLASIA, UNSPECIFIED WHETHER LOWER URINARY TRACT SYMPTOMS PRESENT: ICD-10-CM

## 2024-11-21 DIAGNOSIS — E55.9 VITAMIN D DEFICIENCY: ICD-10-CM

## 2024-11-21 DIAGNOSIS — E78.00 PURE HYPERCHOLESTEROLEMIA: Primary | ICD-10-CM

## 2024-11-21 NOTE — TELEPHONE ENCOUNTER
Please enter lab orders for the patient's upcoming physical appointment.     Physical scheduled:   Your appointments       Date & Time Appointment Department ([place])    Dec 16, 2024 9:00 AM CST Medicare Annual Well Visit with Peng Caldwell MD [facility] ([facility])              [facility]  [address] [place] [address] [place] [id number]  [phone]           Preferred lab: [facility] ([facility])     The patient has been notified to complete fasting labs prior to their physical appointment.

## 2024-11-30 ENCOUNTER — LAB ENCOUNTER (OUTPATIENT)
Dept: LAB | Age: 69
End: 2024-11-30
Attending: FAMILY MEDICINE
Payer: MEDICARE

## 2024-11-30 DIAGNOSIS — N40.0 BENIGN PROSTATIC HYPERPLASIA, UNSPECIFIED WHETHER LOWER URINARY TRACT SYMPTOMS PRESENT: ICD-10-CM

## 2024-11-30 DIAGNOSIS — E55.9 VITAMIN D DEFICIENCY: ICD-10-CM

## 2024-11-30 DIAGNOSIS — E78.00 PURE HYPERCHOLESTEROLEMIA: ICD-10-CM

## 2024-11-30 DIAGNOSIS — Z12.5 SCREENING FOR MALIGNANT NEOPLASM OF PROSTATE: ICD-10-CM

## 2024-11-30 LAB
ALBUMIN SERPL-MCNC: 4.7 G/DL (ref 3.2–4.8)
ALBUMIN/GLOB SERPL: 1.7 {RATIO} (ref 1–2)
ALP LIVER SERPL-CCNC: 59 U/L
ALT SERPL-CCNC: 38 U/L
ANION GAP SERPL CALC-SCNC: 9 MMOL/L (ref 0–18)
AST SERPL-CCNC: 27 U/L (ref ?–34)
BILIRUB SERPL-MCNC: 0.8 MG/DL (ref 0.2–1.1)
BUN BLD-MCNC: 19 MG/DL (ref 9–23)
CALCIUM BLD-MCNC: 9.6 MG/DL (ref 8.7–10.4)
CHLORIDE SERPL-SCNC: 105 MMOL/L (ref 98–112)
CHOLEST SERPL-MCNC: 222 MG/DL (ref ?–200)
CO2 SERPL-SCNC: 28 MMOL/L (ref 21–32)
COMPLEXED PSA SERPL-MCNC: 1.61 NG/ML (ref ?–4)
CREAT BLD-MCNC: 0.99 MG/DL
EGFRCR SERPLBLD CKD-EPI 2021: 82 ML/MIN/1.73M2 (ref 60–?)
FASTING PATIENT LIPID ANSWER: YES
FASTING STATUS PATIENT QL REPORTED: YES
GLOBULIN PLAS-MCNC: 2.8 G/DL (ref 2–3.5)
GLUCOSE BLD-MCNC: 102 MG/DL (ref 70–99)
HDLC SERPL-MCNC: 48 MG/DL (ref 40–59)
LDLC SERPL CALC-MCNC: 144 MG/DL (ref ?–100)
NONHDLC SERPL-MCNC: 174 MG/DL (ref ?–130)
OSMOLALITY SERPL CALC.SUM OF ELEC: 296 MOSM/KG (ref 275–295)
POTASSIUM SERPL-SCNC: 4.9 MMOL/L (ref 3.5–5.1)
PROT SERPL-MCNC: 7.5 G/DL (ref 5.7–8.2)
SODIUM SERPL-SCNC: 142 MMOL/L (ref 136–145)
TRIGL SERPL-MCNC: 165 MG/DL (ref 30–149)
VIT D+METAB SERPL-MCNC: 34.1 NG/ML (ref 30–100)
VLDLC SERPL CALC-MCNC: 31 MG/DL (ref 0–30)

## 2024-11-30 PROCEDURE — 80053 COMPREHEN METABOLIC PANEL: CPT

## 2024-11-30 PROCEDURE — 36415 COLL VENOUS BLD VENIPUNCTURE: CPT

## 2024-11-30 PROCEDURE — 82306 VITAMIN D 25 HYDROXY: CPT

## 2024-11-30 PROCEDURE — 80061 LIPID PANEL: CPT

## 2024-12-16 ENCOUNTER — OFFICE VISIT (OUTPATIENT)
Dept: FAMILY MEDICINE CLINIC | Facility: CLINIC | Age: 69
End: 2024-12-16
Payer: MEDICARE

## 2024-12-16 VITALS
WEIGHT: 207 LBS | DIASTOLIC BLOOD PRESSURE: 80 MMHG | OXYGEN SATURATION: 94 % | SYSTOLIC BLOOD PRESSURE: 138 MMHG | BODY MASS INDEX: 32.11 KG/M2 | HEART RATE: 90 BPM | HEIGHT: 67.5 IN | RESPIRATION RATE: 16 BRPM

## 2024-12-16 DIAGNOSIS — E55.9 VITAMIN D DEFICIENCY: ICD-10-CM

## 2024-12-16 DIAGNOSIS — E78.00 PURE HYPERCHOLESTEROLEMIA: ICD-10-CM

## 2024-12-16 DIAGNOSIS — Z00.00 ENCOUNTER FOR ANNUAL HEALTH EXAMINATION: Primary | ICD-10-CM

## 2024-12-16 NOTE — PROGRESS NOTES
Subjective:   Renny Prado is a 69 year old male who presents for a Subsequent Annual Wellness visit (Pt already had Initial Annual Wellness) and scheduled follow up of multiple significant but stable problems.   Preventative Screening  Colonoscopy - 1/2020.  Repeat 5 yrs.  Scheduled for 1/30/2025  Prostate - 1.61.  Taking saw palmetto capsules.   Immunizations - flu and COVID UTD.  Shingrix x 2.  TDaP 2016.  PNA UTD x 2.      HLD - on omega three.  Lipids up a little from previous baseline.  Not much activity.     History/Other:   Fall Risk Assessment:   He has been screened for Falls and is low risk.      Cognitive Assessment:   He had a completely normal cognitive assessment - see flowsheet entries     Functional Ability/Status:   Renny Prado has some abnormal functions as listed below:  He has problems with Memory based on screening of functional status.       Depression Screening (PHQ):  PHQ-2 SCORE: 1  , done 12/16/2024   Feeling down, depressed, or hopeless: 1    Trouble falling or staying asleep, or sleeping too much: 1     Feeling tired or having little energy: 2    Trouble concentrating on things, such as reading the newspaper or watching television: 1    If you checked off any problems, how difficult have these problems made it for you to do your work, take care of things at home, or get along with other people?: Somewhat difficult              Advanced Directives:   He does NOT have a Living Will. [Do you have a living will?: No]  He does NOT have a Power of  for Health Care. [Do you have a healthcare power of ?: No]  Discussed Advance Care Planning with patient (and family/surrogate if present). Standard forms made available to patient in After Visit Summary.      Patient Active Problem List   Diagnosis    Pure hypercholesterolemia    Vitamin D deficiency    BPH (benign prostatic hyperplasia)     Allergies:  He has No Known Allergies.    Current Medications:  No outpatient  medications have been marked as taking for the 12/16/24 encounter (Office Visit) with Peng Caldwell MD.       Medical History:  He  has a past medical history of Arthritis (2017), Flatulence/gas pain/belching, Frequent urination (Last few years), Hemorrhoids (Decades), Irregular bowel habits, Sleep disturbance, Stress (Years), Visual impairment, and Wears glasses (1960).  Surgical History:  He  has a past surgical history that includes colonoscopy (1/1/09); colonoscopy (1/1/01); vasectomy; and colonoscopy with biopsy (10/14/14).   Family History:  His family history includes Breast Cancer in his sister; Cancer in his sister and sister; Diabetes in his father and maternal grandmother; Pacemaker in his father; Seizure Disorder in his father; Stroke in his mother; Uterine Cancer in his sister.  Social History:  He  reports that he has never smoked. He has never used smokeless tobacco. He reports that he does not drink alcohol and does not use drugs.    Tobacco:  He has never smoked tobacco.    CAGE Alcohol Screen:   CAGE screening score of 0 on 12/16/2024, showing low risk of alcohol abuse.      Patient Care Team:  Peng Caldwell MD as PCP - General (Family Medicine)  Mike Castillo MD (GASTROENTEROLOGY)    Review of Systems  Constitutional: negative  Eyes: negative  Ears, nose, mouth, throat, and face: negative  Respiratory: negative  Cardiovascular: negative  Gastrointestinal: negative  Genitourinary: negative  Neurological: negative      Objective:   Physical Exam  General Appearance:  Alert, cooperative, no distress, appears stated age   Head:  Normocephalic, without obvious abnormality, atraumatic   Eyes:  PERRL, conjunctiva/corneas clear, EOM's intact   Neck: Supple, symmetrical, trachea midline, no adenopathy   Back:   Symmetric, no curvature, ROM normal, no CVA tenderness   Lungs:   Clear to auscultation bilaterally, respirations unlabored   Chest Wall:  No tenderness or deformity   Heart:  Regular  rate and rhythm, S1, S2 normal, no murmur, rub or gallop   Abdomen:   Soft, non-tender, bowel sounds active all four quadrants,  no masses, no organomegaly   Extremities: Extremities normal, atraumatic, no cyanosis or edema   Pulses: 2+ and symmetric   Skin: Skin color, texture, turgor normal, no rashes or lesions   Neurologic: Normal      /80   Pulse 90   Resp 16   Ht 5' 7.5\" (1.715 m)   Wt 207 lb (93.9 kg)   SpO2 94%   BMI 31.94 kg/m²  Estimated body mass index is 31.94 kg/m² as calculated from the following:    Height as of this encounter: 5' 7.5\" (1.715 m).    Weight as of this encounter: 207 lb (93.9 kg).    Medicare Hearing Assessment:   Hearing Screening    Time taken: 12/16/2024  9:06 AM  Screening Method: Whisper Test  Whisper Test Result: Pass         Visual Acuity:   Right Eye Visual Acuity: Uncorrected Right Eye Chart Acuity: 20/40   Left Eye Visual Acuity: Uncorrected Left Eye Chart Acuity: 20/30   Both Eyes Visual Acuity: Uncorrected Both Eyes Chart Acuity: 20/30   Able To Tolerate Visual Acuity: Yes        Assessment & Plan:     Renny Prado was seen in the office today:  had concerns including Health Maintenance (Reviewed Preventative/Wellness form with patient./).    1. Encounter for annual health examination  Overall well  Need to be more active.  Should help with a lot of what he is feeling  Colon screening up-to-date and repeat scheduled  Labs reassuring  Immunizations up-to-date    2. Pure hypercholesterolemia  Stable.  Taking fish oil  Continue.  Adding exercise should help further    3. Vitamin D deficiency        Peng Caldwell M.D.   EM[facility] 3  12/16/24      The patient indicates understanding of these issues and agrees to the plan.  Reinforced healthy diet, lifestyle, and exercise.      No follow-ups on file.     Peng Caldwell MD, 12/16/2024     Supplementary Documentation:   General Health:  In the past six months, have you lost more than 10 pounds without  trying?: 2 - No  Has your appetite been poor?: No  Type of Diet: Balanced  How does the patient maintain a good energy level?: Appropriate Exercise  How would you describe your daily physical activity?: Light  How would you describe your current health state?: Fair  How do you maintain positive mental well-being?: Social Interaction;Visiting Family  On a scale of 0 to 10, with 0 being no pain and 10 being severe pain, what is your pain level?: 4 - (Moderate)  In the past six months, have you experienced urine leakage?: 0-No  At any time do you feel concerned for the safety/well-being of yourself and/or your children, in your home or elsewhere?: No  Have you had any immunizations at another office such as Influenza, Hepatitis B, Tetanus, or Pneumococcal?: Yes    Health Maintenance   Topic Date Due    Annual Physical  12/15/2024    Colorectal Cancer Screening  01/15/2025    HTN: BP Follow-Up  01/16/2025    PSA  11/30/2026    Influenza Vaccine  Completed    Annual Depression Screening  Completed    Fall Risk Screening (Annual)  Completed    Pneumococcal Vaccine: 65+ Years  Completed    Zoster Vaccines  Completed    COVID-19 Vaccine  Completed

## 2025-01-30 PROBLEM — Z86.0101 PERSONAL HISTORY OF ADENOMATOUS AND SERRATED COLON POLYPS: Status: ACTIVE | Noted: 2025-01-30

## 2025-06-13 ENCOUNTER — APPOINTMENT (OUTPATIENT)
Dept: GENERAL RADIOLOGY | Age: 70
End: 2025-06-13
Attending: EMERGENCY MEDICINE
Payer: MEDICARE

## 2025-06-13 ENCOUNTER — HOSPITAL ENCOUNTER (OUTPATIENT)
Age: 70
Discharge: HOME | End: 2025-06-13
Attending: EMERGENCY MEDICINE
Payer: MEDICARE

## 2025-06-13 VITALS
OXYGEN SATURATION: 95 % | HEART RATE: 84 BPM | BODY MASS INDEX: 31.07 KG/M2 | HEIGHT: 68 IN | DIASTOLIC BLOOD PRESSURE: 101 MMHG | RESPIRATION RATE: 18 BRPM | SYSTOLIC BLOOD PRESSURE: 147 MMHG | WEIGHT: 205 LBS | TEMPERATURE: 99 F

## 2025-06-13 DIAGNOSIS — J22 LOWER RESPIRATORY TRACT INFECTION: Primary | ICD-10-CM

## 2025-06-13 PROCEDURE — 99213 OFFICE O/P EST LOW 20 MIN: CPT

## 2025-06-13 PROCEDURE — 71046 X-RAY EXAM CHEST 2 VIEWS: CPT | Performed by: EMERGENCY MEDICINE

## 2025-06-13 PROCEDURE — 99204 OFFICE O/P NEW MOD 45 MIN: CPT

## 2025-06-13 RX ORDER — BENZONATATE 100 MG/1
100 CAPSULE ORAL 3 TIMES DAILY PRN
Qty: 30 CAPSULE | Refills: 0 | Status: SHIPPED | OUTPATIENT
Start: 2025-06-13 | End: 2025-07-13

## 2025-06-13 RX ORDER — DOXYCYCLINE 100 MG/1
100 CAPSULE ORAL 2 TIMES DAILY
Qty: 14 CAPSULE | Refills: 0 | Status: SHIPPED | OUTPATIENT
Start: 2025-06-13 | End: 2025-06-20

## 2025-06-13 NOTE — DISCHARGE INSTRUCTIONS
Take antibiotics as prescribed use over-the-counter cough or cold medications use the Tessalon for more severe coughing.  Follow-up with your doctor over the next week if not better return if worse

## 2025-06-13 NOTE — ED PROVIDER NOTES
Patient Seen in: [facility]        History  Chief Complaint   Patient presents with    Cough/URI     Stated Complaint: cough    Subjective:   HPI    70-year-old male complaint of cough the patient describes a cough for about 2 weeks is productive of some yellowish-green phlegm he has some intermittent spasms which she does feel short of breath during that time but there is no shortness of breath with exertion's had difficulty sleeping due to the coughing.  It started off with some runny nose and cold-like symptoms and now its persisted.  No fever denies any history of lung problems he is a non-smoker no heart problems denies any swelling his legs are no vomiting diarrhea.  Denies any earache there are some mild soreness in his throat      Objective:     Past Medical History:    Arthritis    Hips, lower back    Fatigue    Flatulence/gas pain/belching    Occassional    Frequent urination    Heartburn    Occassional    Hemorrhoids    Irregular bowel habits    Don’t have bowel movement every day.    Shortness of breath    Sleep disturbance    Stress + chronically ill 4 year old daughter    Stool incontinence    Some fluid leakage once in a while    Stress    Lived in [place], now back with culture shock    Visual impairment    glasses    Wears glasses              Past Surgical History:   Procedure Laterality Date    Colonoscopy  01/01/2009    Colonoscopy  01/01/2001    R Leg placement of pins    Colonoscopy with biopsy  10/14/2014    colon polyps;diverticulil;internal hemorrhoids    Hemorrhoidectomy,int/ext,simple      Vasectomy                  Social History     Socioeconomic History    Marital status:    Occupational History    Occupation: retired   Tobacco Use    Smoking status: Never    Smokeless tobacco: Never   Vaping Use    Vaping status: Never Used   Substance and Sexual Activity    Alcohol use: Never    Drug use: Never   Other Topics Concern     Service No    Blood Transfusions  No    Caffeine Concern Yes     Comment: 4 cups coffee daily    Exercise No    Seat Belt Yes              Review of Systems    Positive for stated complaint: cough  Other systems are as noted in HPI.  Constitutional and vital signs reviewed.      All other systems reviewed and negative except as noted above.                  Physical Exam    ED Triage Vitals [06/13/25 0808]   BP (!) 147/101   Pulse 84   Resp 18   Temp 98.6 °F (37 °C)   Temp src Oral   SpO2 95 %   O2 Device None (Room air)       Current Vitals:   Vital Signs  BP: (!) 147/101  Pulse: 84  Resp: 18  Temp: 98.6 °F (37 °C)  Temp src: Oral    Oxygen Therapy  SpO2: 95 %  O2 Device: None (Room air)            Physical Exam  The patient is alert and orient x 3 no acute distress HEENT exam the oropharynx clear tympanic membranes normal neck there is no lymphadenopathy or JVD lungs are clear cardiovascular exam shows regular rate and rhythm without murmurs abdomen soft nontender skin is no rash extremities no clubbing cyanosis or edema        ED Course  Labs Reviewed - No data to display       XR CHEST PA + LAT CHEST (KEP=[id number])  Result Date: 6/13/2025  CONCLUSION:  There is no evidence of active cardiopulmonary disease.   LOCATION:  [facility]   Dictated by (CST): Ji Rojas MD on 6/13/2025 at 9:43 AM     Finalized by (CST): Ji Rojas MD on 6/13/2025 at 9:43 AM       Images independently reviewed there is no pneumonia.                  MDM     Initial differential diagnosis considered not limited to includes pneumonia CHF URI bronchitis        Medical Decision Making    Imaging unremarkable however the patient's had a productive cough for 2 weeks with some sinus congestion will treat with doxycycline and Tessalon.  Disposition and Plan     Clinical Impression:  1. Lower respiratory tract infection         Disposition:  Discharge  6/13/2025  9:50 am    Follow-up:  Peng Caldwell MD  [address]  [facility] [address] [phone]    In 1  week            Medications Prescribed:  Current Discharge Medication List        START taking these medications    Details   doxycycline 100 MG Oral Cap Take 1 capsule (100 mg total) by mouth 2 (two) times daily for 7 days.  Qty: 14 capsule, Refills: 0      benzonatate 100 MG Oral Cap Take 1 capsule (100 mg total) by mouth 3 (three) times daily as needed for cough.  Qty: 30 capsule, Refills: 0                   Supplementary Documentation:

## (undated) DEVICE — 3M™ RED DOT™ MONITORING ELECTRODE WITH FOAM TAPE AND STICKY GEL, 50/BAG, 20/CASE, 72/PLT 2570: Brand: RED DOT™

## (undated) DEVICE — 1200CC GUARDIAN II: Brand: GUARDIAN

## (undated) DEVICE — FILTERLINE NASAL ADULT O2/CO2

## (undated) DEVICE — ENDOSCOPY PACK - LOWER: Brand: MEDLINE INDUSTRIES, INC.

## (undated) DEVICE — Device: Brand: DEFENDO AIR/WATER/SUCTION AND BIOPSY VALVE

## (undated) DEVICE — SPEEDBAND SUPERVIEW SUPER 7

## (undated) NOTE — LETTER
PATIENT NAME: Levy Current   : 1955       Waiver      DATE: 2018     Dear Patient,    Thank you for choosing [facility] as your health care provider. Your physician has deemed the following medical service(s) necessary.   Bismark Solano

## (undated) NOTE — LETTER
[address] [facility], [address] [place] [address], [address] [place] [id number] [id number]            Re: Tayler Jane  : 1955      To whom it may concern    This letter is written at the request of the patient additional questions                    Loy Samson M.D.   EMG 3  12/27/17